# Patient Record
Sex: FEMALE | Race: BLACK OR AFRICAN AMERICAN | Employment: FULL TIME | URBAN - METROPOLITAN AREA
[De-identification: names, ages, dates, MRNs, and addresses within clinical notes are randomized per-mention and may not be internally consistent; named-entity substitution may affect disease eponyms.]

---

## 2020-08-19 ENCOUNTER — HOSPITAL ENCOUNTER (OUTPATIENT)
Age: 55
Setting detail: OUTPATIENT SURGERY
Discharge: HOME | End: 2020-08-19
Attending: INTERNAL MEDICINE | Admitting: INTERNAL MEDICINE
Payer: COMMERCIAL

## 2020-08-19 VITALS
HEART RATE: 84 BPM | TEMPERATURE: 98.1 F | HEIGHT: 63 IN | BODY MASS INDEX: 26.75 KG/M2 | SYSTOLIC BLOOD PRESSURE: 111 MMHG | OXYGEN SATURATION: 99 % | DIASTOLIC BLOOD PRESSURE: 60 MMHG | RESPIRATION RATE: 17 BRPM | WEIGHT: 151 LBS

## 2020-08-19 DIAGNOSIS — I27.20 PULMONARY HTN (HCC): ICD-10-CM

## 2020-08-19 PROCEDURE — 99153 MOD SED SAME PHYS/QHP EA: CPT | Performed by: INTERNAL MEDICINE

## 2020-08-19 PROCEDURE — C1894 INTRO/SHEATH, NON-LASER: HCPCS | Performed by: INTERNAL MEDICINE

## 2020-08-19 PROCEDURE — 93451 RIGHT HEART CATH: CPT | Performed by: INTERNAL MEDICINE

## 2020-08-19 PROCEDURE — 74011000250 HC RX REV CODE- 250: Performed by: INTERNAL MEDICINE

## 2020-08-19 PROCEDURE — 99152 MOD SED SAME PHYS/QHP 5/>YRS: CPT | Performed by: INTERNAL MEDICINE

## 2020-08-19 PROCEDURE — 77030013744: Performed by: INTERNAL MEDICINE

## 2020-08-19 PROCEDURE — 93463 DRUG ADMIN & HEMODYNMIC MEAS: CPT

## 2020-08-19 PROCEDURE — 77030013797 HC KT TRNSDUC PRSSR EDWD -A: Performed by: INTERNAL MEDICINE

## 2020-08-19 PROCEDURE — 73610000005 HC INO THERAPY INITIAL

## 2020-08-19 PROCEDURE — 74011250636 HC RX REV CODE- 250/636: Performed by: INTERNAL MEDICINE

## 2020-08-19 PROCEDURE — C1751 CATH, INF, PER/CENT/MIDLINE: HCPCS | Performed by: INTERNAL MEDICINE

## 2020-08-19 RX ORDER — LIDOCAINE HYDROCHLORIDE 10 MG/ML
INJECTION INFILTRATION; PERINEURAL AS NEEDED
Status: DISCONTINUED | OUTPATIENT
Start: 2020-08-19 | End: 2020-08-19 | Stop reason: HOSPADM

## 2020-08-19 RX ORDER — FENTANYL CITRATE 50 UG/ML
INJECTION, SOLUTION INTRAMUSCULAR; INTRAVENOUS AS NEEDED
Status: DISCONTINUED | OUTPATIENT
Start: 2020-08-19 | End: 2020-08-19 | Stop reason: HOSPADM

## 2020-08-19 RX ORDER — DOCUSATE SODIUM 100 MG/1
100 CAPSULE, LIQUID FILLED ORAL 2 TIMES DAILY
COMMUNITY

## 2020-08-19 RX ORDER — FUROSEMIDE 40 MG/1
40 TABLET ORAL 2 TIMES DAILY
COMMUNITY

## 2020-08-19 RX ORDER — SPIRONOLACTONE 25 MG/1
25 TABLET ORAL DAILY
COMMUNITY

## 2020-08-19 RX ORDER — MIDAZOLAM HYDROCHLORIDE 1 MG/ML
INJECTION, SOLUTION INTRAMUSCULAR; INTRAVENOUS AS NEEDED
Status: DISCONTINUED | OUTPATIENT
Start: 2020-08-19 | End: 2020-08-19 | Stop reason: HOSPADM

## 2020-08-19 RX ORDER — SODIUM CHLORIDE 0.9 % (FLUSH) 0.9 %
5-40 SYRINGE (ML) INJECTION AS NEEDED
Status: DISCONTINUED | OUTPATIENT
Start: 2020-08-19 | End: 2020-08-19 | Stop reason: HOSPADM

## 2020-08-19 RX ORDER — ALBUTEROL SULFATE 90 UG/1
2 AEROSOL, METERED RESPIRATORY (INHALATION) 2 TIMES DAILY
COMMUNITY

## 2020-08-19 RX ORDER — CARVEDILOL 3.12 MG/1
3.12 TABLET ORAL 2 TIMES DAILY WITH MEALS
COMMUNITY

## 2020-08-19 RX ORDER — SODIUM CHLORIDE 0.9 % (FLUSH) 0.9 %
5-40 SYRINGE (ML) INJECTION EVERY 8 HOURS
Status: DISCONTINUED | OUTPATIENT
Start: 2020-08-19 | End: 2020-08-19 | Stop reason: HOSPADM

## 2020-08-19 NOTE — PROGRESS NOTES
TRANSFER - OUT REPORT:    Verbal report given to Hola Pacheco on Paula Fuentes being transferred to cath lab recovery for routine progression of care       Report consisted of patients Situation, Background, Assessment and   Recommendations(SBAR). Information from the following report(s) Procedure Summary was reviewed with the receiving nurse. Opportunity for questions and clarification was provided. Cardiac Cath Lab Procedure Area Arrival Note:    Ihsan Ross arrived to Cardiac Cath Lab, Procedure Area. Patient identifiers verified with NAME and DATE OF BIRTH. Procedure verified with patient. Consent forms verified. Allergies verified. Patient informed of procedure and plan of care. Questions answered with review. Patient voiced understanding of procedure and plan of care. Patient on cardiac monitor, non-invasive blood pressure, SPO2 monitor. On  or O2 @ 2 lpm via nasal canula. IV of 0.9% NS on pump at 25 ml/hr. Patient status doing well without problems. Patient is A&Ox 4. Patient reports no pain. Patient medicated during procedure with orders obtained and verified by Dr. Enrrique May. Refer to patients Cardiac Cath Lab PROCEDURE REPORT for vital signs, assessment, status, and response during procedure, printed at end of case. Printed report on chart or scanned into chart.

## 2020-08-19 NOTE — DISCHARGE INSTRUCTIONS
CARDIAC CATHETERIZATION/ CATH STENT PLACEMENT   DISCHARGE INSTRUCTIONS    If possible, have someone stay with you for the first night. It is normal to feel tired for the first couple of days. Take it easy and follow your physicians instructions on activity. CHECK THE CATHETER INSERTION SITE DAILY:    If bleeding at the cath site occurs, take a clean washcloth and apply direct pressure just above the puncture site for at least 15 minutes. Call 911 immediately if the bleeding is not controlled. Continue to apply direct pressure until an ambulance gets to your location. Do not try to drive yourself or have someone else drive you to the hospital.  Have the ambulance bring you to the emergency room. You may shower 24 hours after your procedure. Gently remove the bandage during showering. Wash with soap and water and pat dry. To prevent infection, keep the groin area/insertion site clean and dry. Do not apply powders, creams, lotions, or ointments to the site for 5 days. You may cover the site with a fresh Band-Aid each day until well healed. You may notice a small lump at the site. This is normal and may last up to 6 weeks. CALL THE PHYSICIAN:  ? If the site becomes red, swollen, or feels warm to the touch, or is healing poorly    ? If you note any large/extending bruise, fever >101.0 or chills  ? If your extremity has numbness, tingling, discoloration, abnormal swelling, tightness or pain   ? If you have difficulty with urination or develop nausea, vomiting, or severe abdominal pain    ACTIVITY for the first 24-48 hours, or as instructed by your physician:  ? No lifting, pushing or pulling over 10 pounds and no straining the insertion site. Do not lift grocery bags or the garbage can; do not run the vacuum  or  for 7 days. ? You may start walking short distances the day of your procedure. Gradually increase as tolerated each day.   Current activity recommendations are 30 minutes of exercise at least 5 days a week. Work up to this as you recover. ? Avoid walking outside in extremes of heat or cold. Walk inside (at home, at the mall, or at a large store) when it is cold and windy or hot and humid. THINGS TO KEEP IN MIND:   ? Do not drive, operate any machinery, or sign any legal documents for 24 hours after your procedure, or as directed by your physician. You must have someone drive you home after your procedure. ? Drink plenty of fluids for 24-48 hours after your procedure to flush the contrast dye from your kidneys. ? Limit the number of times you go up and down the stairs  ? Take rests and pace yourself with activity  ? Be careful and do not strain with bowel movements    MEDICATIONS:  ? Take all medications as prescribed  ? Call your physician if you have any questions  ? Keep an updated list of your medications with you at all times and give a list to your primary physician and pharmacist  ? You may use Tylenol 325mg 1-2 tablets every 6 hours as needed for pain or discomfort, unless otherwise instructed. If you have significant discomfort more than 48 hours after your procedure, please call your physicians office. SIGNS AND SYMPTOMS:  ? Notify your physician for new or recurrent symptoms of chest discomfort, unusual shortness of breath or fatigue. These could be signs of a problem and should be discussed with your physician. ? For significant chest pain or symptoms of angina not relieved with rest:  if you have been prescribed Nitroglycerin, take as directed (taken under the tongue, one at a time 5 minutes apart for a total of 3 doses, sitting down). If the discomfort is not relieved after the 3rd Nitroglycerin, call 911. If you have not been prescribed Nitroglycerin and your chest discomfort is significant, call 911. Take the ambulance, do not try to drive yourself or have someone else drive you to the hospital.     AFTER CARE:  ?  Follow up with your physician as instructed  ? Follow a heart healthy diet with proper portion control, daily stress management, daily exercise, blood pressure and cholesterol control, and smoking cessation. The success of your stent, if you had one placed, and the prevention of future catheterizations heavily depends on your lifestyle changes you make now! ? You may start walking short distances the day of your procedure. Gradually increase as tolerated each day. Current activity recommendations are 30 minutes of exercise at least 5 days a week. Work up to this as you recover. Walk, ride a bike, or choose any other activity you enjoy to reach this activity goal.   ? Avoid walking outside in extremes of heat or cold. Walk inside (at home, at the mall, or at a large store) when it is cold and windy or hot and humid. ? If you had a stent placed, consider [facility] as a resource to help you make the needed lifestyle changes to live a heart healthy lifestyle. Discuss your candidacy with your physician. If you have questions, call your physicians office or the Cardiac Cath Lab at [phone]. The Cath Lab is operational from 6:30 a.m. to 5:00 p.m., Monday through Friday. After hours, notify your physician. [address] [place] [address] can be reached at [phone]. [facility] is operational Monday-Thursday 8:30 a.m. to 5:00 p.m. and Friday 8:30 a.m. to 12:00 p.m.       Remember:  IN CASE OF BLEEDING: KEEP FIRM PRESSURE ON THE PROCEDURE SITE AND CALL [phone] IF NOT CONTROLLED

## 2020-08-19 NOTE — PROGRESS NOTES
1148:  TRANSFER - IN REPORT:    Verbal report received from Pankaj Jerry on [address] [place] [address] , from the Cardiac Cath lab, for routine progression of care. Report consisted of patients Situation, Background, Assessment and Recommendations(SBAR). Information from the following report(s) Procedure Summary, Intake/Output, MAR and Recent Results was reviewed with the receiving clinician. Opportunity for questions and clarification was provided. Assessment completed upon patients arrival to [address] [place] [address] and care assumed. Cardiac Cath Lab Recovery Arrival Note:     [address] [place] [address] arrived to [facility] recovery area. Patient procedure= RHC. Patient on cardiac monitor, non-invasive blood pressure, Patient status doing well without problems. Patient is A&Ox 4. Patient reports no complaints. Procedure site without any bleeding and no hematoma.

## 2020-08-19 NOTE — ROUTINE PROCESS
Cardiac Cath Lab Recovery Arrival Note: [facility] arrived to Cardiac Cath Lab, Recovery Area. Staff introduced to patient. Patient identifiers verified with NAME and DATE OF BIRTH. Procedure verified with patient. Consent forms reviewed and signed by patient or authorized representative and verified. Allergies verified. Patient informed of procedure and plan of care. Questions answered with review. Patient prepped for procedure, per orders from physician, prior to arrival. 
 
Patient on cardiac monitor, non-invasive blood pressure, SPO2 monitor. Patient is A&Ox 4. Patient reports no complaints. Patient in stretcher, in low position, with side rails up, call bell within reach, patient instructed to call of assistance as needed. Patient prep in: [facility] Recovery Area, Bed# 2. Family in: outside the hospital.  
Prep by: ADELINA Tran

## 2020-08-19 NOTE — ROUTINE PROCESS
1324:  I have reviewed discharge instructions with the patient. The patient verbalized understanding. 1330: [facility] ambulated @ 7509 (time) approximately 20 feet. Patient tolerated ambulation without adverse advents. right IJ (right/left, groin/arm)  without bleeding or hematoma noted. 1341:  Patient wheeled out via wheelchair for discharge.

## 2020-08-19 NOTE — PROGRESS NOTES
08/19/20 1114 08/19/20 1124 08/19/20 1135   Nitric Oxide   Tank  500 500  (discontinued WERNER at 11:35AM)   Tank Serial # [id number]   (same) [id number]   Delivery Mode Non-invasive  (simple mask) Non-invasive Non-invasive   $$ Nitric Initiated Yes  --   --    $$ Nitric Subsequent Hours  --   --    (21 minutes)   $$ Nitric Oxide Cath Lab Admin Yes  --   --    FIO2 Low 21 21  --    NO2 High 3 3  --    NO High 36 45  --    Nitric Oxide   PPM NO Set 25 40  --    PPM NO Observed 25 40  (increased to 40 PPM)  --    PPM NO2 Observed 1 1  --    O2 Index 95 95  --

## 2023-05-14 RX ORDER — FUROSEMIDE 40 MG/1
40 TABLET ORAL 2 TIMES DAILY
COMMUNITY

## 2023-05-14 RX ORDER — ALBUTEROL SULFATE 90 UG/1
2 AEROSOL, METERED RESPIRATORY (INHALATION) 2 TIMES DAILY
COMMUNITY

## 2023-05-14 RX ORDER — CARVEDILOL 3.12 MG/1
3.12 TABLET ORAL 2 TIMES DAILY WITH MEALS
COMMUNITY

## 2023-05-14 RX ORDER — SPIRONOLACTONE 25 MG/1
25 TABLET ORAL DAILY
COMMUNITY

## 2023-05-14 RX ORDER — PSEUDOEPHEDRINE HCL 30 MG
100 TABLET ORAL 2 TIMES DAILY
COMMUNITY

## (undated) DEVICE — PINNACLE INTRODUCER SHEATH: Brand: PINNACLE

## (undated) DEVICE — SPECIAL PROCEDURE DRAPE 32" X 34": Brand: SPECIAL PROCEDURE DRAPE

## (undated) DEVICE — DRAPE PRB US TRNSDCR 6X96IN --

## (undated) DEVICE — CATHETER DIAG 6FR L110CM INTRO 6FR BLLN DIA9MM 1CC PULM ART

## (undated) DEVICE — PRESSURE MONITORING SET: Brand: TRUWAVE

## (undated) DEVICE — WASTE KIT - ST MARY: Brand: MEDLINE INDUSTRIES, INC.

## (undated) DEVICE — PACK PROCEDURE SURG HRT CATH

## (undated) DEVICE — CO-SET DELIVERY SYSTEM FOR 123 ROOM TEMPATURE INJECTATE: Brand: CO-SET+

## (undated) DEVICE — ANGIOGRAPHY KIT